# Patient Record
Sex: MALE | Race: WHITE | NOT HISPANIC OR LATINO | URBAN - METROPOLITAN AREA
[De-identification: names, ages, dates, MRNs, and addresses within clinical notes are randomized per-mention and may not be internally consistent; named-entity substitution may affect disease eponyms.]

---

## 2024-07-12 ENCOUNTER — OFFICE VISIT (OUTPATIENT)
Dept: INTERNAL MEDICINE | Facility: CLINIC | Age: 19
End: 2024-07-12
Payer: OTHER GOVERNMENT

## 2024-07-12 VITALS
HEART RATE: 79 BPM | TEMPERATURE: 99 F | BODY MASS INDEX: 34.08 KG/M2 | SYSTOLIC BLOOD PRESSURE: 142 MMHG | HEIGHT: 66 IN | WEIGHT: 212.06 LBS | DIASTOLIC BLOOD PRESSURE: 80 MMHG | OXYGEN SATURATION: 98 %

## 2024-07-12 DIAGNOSIS — J02.9 SORE THROAT: Primary | ICD-10-CM

## 2024-07-12 DIAGNOSIS — R53.83 FATIGUE, UNSPECIFIED TYPE: ICD-10-CM

## 2024-07-12 DIAGNOSIS — Z76.89 ENCOUNTER TO ESTABLISH CARE WITH NEW DOCTOR: ICD-10-CM

## 2024-07-12 PROCEDURE — 99999 PR PBB SHADOW E&M-NEW PATIENT-LVL III: CPT | Mod: PBBFAC,,, | Performed by: FAMILY MEDICINE

## 2024-07-12 PROCEDURE — 99203 OFFICE O/P NEW LOW 30 MIN: CPT | Mod: PBBFAC | Performed by: FAMILY MEDICINE

## 2024-07-12 PROCEDURE — 99204 OFFICE O/P NEW MOD 45 MIN: CPT | Mod: S$PBB,,, | Performed by: FAMILY MEDICINE

## 2024-07-12 NOTE — PROGRESS NOTES
"Subjective:     Patient ID: Duncan Irwin is a 18 y.o. male.   Chief Complaint: Sore Throat and Fatigue    HPI:  ** note that there were EMR latency issues during the completion of this documentation.  All relevant clinical data was entered at the time of completion, but due to system errors, it is possible some data was lost **    Presenting w/ c/o recurrent sore throat and tender lymph nodes over past several weeks. 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 Reports he had finger-stick mono test which was negative. He reports recurrent tonsil swelling and pain. Having some subjective fevers. Also c/o pervasive fatigue that is out of character for him.    Review of Systems   Constitutional:  Positive for fatigue and fever. Negative for chills.   HENT:  Positive for sore throat. Negative for nasal congestion, ear pain, nosebleeds, rhinorrhea, sinus pressure/congestion, trouble swallowing and voice change.    Respiratory:  Negative for chest tightness and shortness of breath.    Cardiovascular:  Negative for chest pain and palpitations.   Hematological:  Positive for adenopathy.   Psychiatric/Behavioral:  Negative for sleep disturbance. The patient is nervous/anxious.           Objective:      Vitals:    07/12/24 1500 07/12/24 1535   BP: (!) 158/94 (!) 142/80   BP Location: Left arm Left arm   Patient Position: Sitting Sitting   BP Method: Large (Manual) Large (Manual)   Pulse: 79    Temp: 98.9 °F (37.2 °C)    TempSrc: Oral    SpO2: 98%    Weight: 96.2 kg (212 lb 1.3 oz)    Height: 5' 6" (1.676 m)       Physical Exam  Vitals reviewed.   Constitutional:       General: He is not in acute distress.     Appearance: Normal appearance. He is not ill-appearing.   HENT:      Head: Normocephalic and atraumatic.      Right Ear: External ear normal.      Left Ear: External ear normal.      Nose: Nose normal.      Mouth/Throat:      Pharynx: No pharyngeal swelling, oropharyngeal " exudate, posterior oropharyngeal erythema or uvula swelling.      Tonsils: No tonsillar exudate or tonsillar abscesses. 0 on the right. 2+ on the left.      Comments: Cervical lymph node tenderness on R, more prominent on R; tonsils more prominent  Cardiovascular:      Rate and Rhythm: Normal rate and regular rhythm.      Pulses: Normal pulses.      Heart sounds: Normal heart sounds. No murmur heard.     No friction rub. No gallop.   Pulmonary:      Effort: No respiratory distress.      Breath sounds: Normal breath sounds. No stridor. No wheezing, rhonchi or rales.   Abdominal:      General: Abdomen is flat. Bowel sounds are normal.      Palpations: Abdomen is soft.   Musculoskeletal:      Cervical back: Normal range of motion.   Lymphadenopathy:      Cervical: Cervical adenopathy present.   Neurological:      General: No focal deficit present.      Mental Status: He is alert and oriented to person, place, and time. Mental status is at baseline.   Psychiatric:         Mood and Affect: Mood normal.         Behavior: Behavior normal.         Thought Content: Thought content normal.         Judgment: Judgment normal.           Assessment:       Problem List Items Addressed This Visit    None  Visit Diagnoses       Sore throat    -  Primary    Relevant Orders    Comprehensive Metabolic Panel    CBC Auto Differential    Hemoglobin A1C    TREY-BARR VIRUS ANTIBODY PANEL    Ambulatory referral/consult to ENT    Fatigue, unspecified type        Relevant Orders    TSH    T4, Free    Encounter to establish care with new doctor                  Plan:       1. Sore throat  POC Strep negative  Discussed possibile etiologies including EBV/mono, allergic rhinitis  Obtaining basic lab work to establish care while exploring etiologies  Low suspicion for infectious etiology requiring abx intervention at this time  D/t persistent sx will start ENT ref  - Comprehensive Metabolic Panel; Future  - CBC Auto Differential; Future  -  Hemoglobin A1C; Future  - TREY-BARR VIRUS ANTIBODY PANEL; Future  - Ambulatory referral/consult to ENT; Future    2. Fatigue, unspecified type  As above  Checking labs for possible etiologies  - TSH; Future  - T4, Free; Future    3. Encounter to establish care with new doctor  Reviewed chronic medical conditions, updated problem list and medication list          Follow up in about 6 weeks (around 8/23/2024) for Annual Visit.     Qasim Henry MD, FAAFP  Family Medicine Physician  [facility]  07/14/2024      This note was produced using voice recognition technology. Some typographical or syntax errors may be present, despite best efforts with proofreading.

## 2024-07-12 NOTE — LETTER
July 12, 2024      [facility]  [address]  [facility] [address]  Phone: [phone]  Fax: [phone]       Patient: Duncan Irwin   YOB: 2005  Date of Visit: 07/12/2024    To Whom It May Concern:    Bart Irwin  was at [facility] on 07/12/2024. The patient may return to work/school on 07/15/2024 with no restrictions. If you have any questions or concerns, or if I can be of further assistance, please do not hesitate to contact me.    Sincerely,        Qasim Henry MD, FAAFP  Family Medicine Physician  [facility]

## 2024-07-12 NOTE — LETTER
July 12, 2024      [facility]  [address]  [facility] [address]  Phone: [phone]  Fax: [phone]       Patient: Duncan Irwin   YOB: 2005  Date of Visit: 07/12/2024    To Whom It May Concern:    Bart Irwin  was at [facility] on 07/12/2024. The patient may return to work on 07/13/2024 with no restrictions. If you have any questions or concerns, or if I can be of further assistance, please do not hesitate to contact me.    Sincerely,    Diego Mark MA

## 2024-07-18 ENCOUNTER — HOSPITAL ENCOUNTER (EMERGENCY)
Facility: HOSPITAL | Age: 19
Discharge: HOME | End: 2024-07-18
Attending: EMERGENCY MEDICINE
Payer: OTHER GOVERNMENT

## 2024-07-18 ENCOUNTER — TELEPHONE (OUTPATIENT)
Dept: INTERNAL MEDICINE | Facility: CLINIC | Age: 19
End: 2024-07-18
Payer: OTHER GOVERNMENT

## 2024-07-18 VITALS
HEIGHT: 66 IN | HEART RATE: 88 BPM | SYSTOLIC BLOOD PRESSURE: 152 MMHG | RESPIRATION RATE: 16 BRPM | WEIGHT: 212 LBS | TEMPERATURE: 99 F | OXYGEN SATURATION: 98 % | DIASTOLIC BLOOD PRESSURE: 84 MMHG | BODY MASS INDEX: 34.07 KG/M2

## 2024-07-18 DIAGNOSIS — R30.0 DYSURIA: Primary | ICD-10-CM

## 2024-07-18 LAB
BILIRUB UR QL STRIP: NEGATIVE
CLARITY UR REFRACT.AUTO: CLEAR
COLOR UR AUTO: YELLOW
GLUCOSE UR QL STRIP: NEGATIVE
HCV AB SERPL QL IA: NORMAL
HGB UR QL STRIP: NEGATIVE
HIV 1+2 AB+HIV1 P24 AG SERPL QL IA: NORMAL
KETONES UR QL STRIP: NEGATIVE
LEUKOCYTE ESTERASE UR QL STRIP: NEGATIVE
NITRITE UR QL STRIP: NEGATIVE
PH UR STRIP: 7 [PH] (ref 5–8)
PROT UR QL STRIP: NEGATIVE
SP GR UR STRIP: 1.02 (ref 1–1.03)
TREPONEMA PALLIDUM IGG+IGM AB [PRESENCE] IN SERUM OR PLASMA BY IMMUNOASSAY: NONREACTIVE
URN SPEC COLLECT METH UR: NORMAL

## 2024-07-18 PROCEDURE — 81003 URINALYSIS AUTO W/O SCOPE: CPT | Performed by: EMERGENCY MEDICINE

## 2024-07-18 PROCEDURE — 25000003 PHARM REV CODE 250

## 2024-07-18 PROCEDURE — 87591 N.GONORRHOEAE DNA AMP PROB: CPT | Performed by: EMERGENCY MEDICINE

## 2024-07-18 PROCEDURE — 86593 SYPHILIS TEST NON-TREP QUANT: CPT | Performed by: EMERGENCY MEDICINE

## 2024-07-18 PROCEDURE — 87491 CHLMYD TRACH DNA AMP PROBE: CPT | Performed by: EMERGENCY MEDICINE

## 2024-07-18 PROCEDURE — 63600175 PHARM REV CODE 636 W HCPCS

## 2024-07-18 PROCEDURE — 86803 HEPATITIS C AB TEST: CPT | Performed by: PHYSICIAN ASSISTANT

## 2024-07-18 PROCEDURE — 99284 EMERGENCY DEPT VISIT MOD MDM: CPT | Mod: 25

## 2024-07-18 PROCEDURE — 87389 HIV-1 AG W/HIV-1&-2 AB AG IA: CPT | Performed by: PHYSICIAN ASSISTANT

## 2024-07-18 PROCEDURE — 96374 THER/PROPH/DIAG INJ IV PUSH: CPT

## 2024-07-18 RX ORDER — CEFTRIAXONE 500 MG/1
500 INJECTION, POWDER, FOR SOLUTION INTRAMUSCULAR; INTRAVENOUS
Status: DISCONTINUED | OUTPATIENT
Start: 2024-07-18 | End: 2024-07-18

## 2024-07-18 RX ORDER — CEFTRIAXONE 500 MG/1
500 INJECTION, POWDER, FOR SOLUTION INTRAMUSCULAR; INTRAVENOUS
Status: COMPLETED | OUTPATIENT
Start: 2024-07-18 | End: 2024-07-18

## 2024-07-18 RX ORDER — DOXYCYCLINE 100 MG/1
100 CAPSULE ORAL 2 TIMES DAILY
Qty: 19 CAPSULE | Refills: 0 | Status: SHIPPED | OUTPATIENT
Start: 2024-07-18 | End: 2024-07-28

## 2024-07-18 RX ORDER — DOXYCYCLINE HYCLATE 100 MG
100 TABLET ORAL
Status: COMPLETED | OUTPATIENT
Start: 2024-07-18 | End: 2024-07-18

## 2024-07-18 RX ADMIN — CEFTRIAXONE SODIUM 500 MG: 500 INJECTION, POWDER, FOR SOLUTION INTRAMUSCULAR; INTRAVENOUS at 12:07

## 2024-07-18 RX ADMIN — DOXYCYCLINE HYCLATE 100 MG: 100 TABLET, COATED ORAL at 12:07

## 2024-07-18 NOTE — TELEPHONE ENCOUNTER
GWENDOLYN Chavez, Jeffrey Henry Qasim Staff  Please be advised that the patient is a  [employer] member. [facility] requires that any referral must come from the patient's PCM/PCP. Nain has Dr. Robin Green  listed as the PCP/PCM, the referral has to come from this PCP/PCM for this referral to be approved. Please contact the patient to obtain a new referral from her PCM. Thank you    On:7/17/2024 11:47:10 AM By:[facility] Note Type:< Fax Note >  THIS IS AN ACTIVE DUTY WHO NEEDS A REFERRAL FROM THE  PCM FOR ANY TREATMENT/EVALUATION FOR ENT AND MUST INCLUDE AN ORDER  ENTRY #/DARI # FROM THE  PHYSICIAN. PLEASE HAVE THE ACTIVE DUTY  FOLLOW UP WITH THE  PCM FOR ENT.

## 2024-07-18 NOTE — ED PROVIDER NOTES
Encounter Date: 7/18/2024       History     Chief Complaint   Patient presents with    Dysuria     Dysuria x 3 days.      18-year-old male with no past medical history now presents with a chief complaint of dysuria.  Patient reports that for the past 3 days he has experienced worsening pain with urination in addition to penile discomfort at rest.  Patient had a new sexual partner 2 weeks ago and is concerned he may have a sexually transmitted infection. Patient denies any nausea, vomiting, fevers, ulcers, penile discharge, or radiating pain including testicular pain, groin pain, flank pain or back pain.    The history is provided by the patient.     Review of patient's allergies indicates:  No Known Allergies  No past medical history on file.  No past surgical history on file.  No family history on file.  Social History     Tobacco Use    Smoking status: Never    Smokeless tobacco: Never   Substance Use Topics    Alcohol use: Never    Drug use: Never     Review of Systems  See HPI     Physical Exam     Initial Vitals [07/18/24 1004]   BP Pulse Resp Temp SpO2   (!) 152/84 88 16 99.3 °F (37.4 °C) 98 %      MAP       --         Physical Exam  Gen: AxOx3, well nourished, appears stated age, no pallor, no jaundice, appears well hydrated  Eye: EOMI, no scleral icterus, no periorbital edema or ecchymosis  Head: Normocephalic, atraumatic, no lesions, scalp appears normal  ENT: Neck supple, no stridor, no masses, no drooling or voice changes  CVS: All distal pulses intact with normal rate and rhythm, no JVD, normal S1/S2, no murmur  Pulm: Normal breath sounds, no wheezes, rales or rhonchi, no increased work of breathing  Abd:  Nondistended, soft, nontender, no organomegaly, no CVAT  : Performed with chaperone present  - Normal-appearing penis and scrotum  - No discharge noted from meatus  - No inguinal swellings  - No tenderness to palpation over penis  - No tenderness to palpation of testis  Ext: No edema, no lesions,  rashes, or deformity  Neuro: GCS15, moving all extremities, gait intact, face grossly symmetric  Psych: normal affect, cooperative, well groomed, makes good eye contact    ED Course   Procedures  Labs Reviewed   HIV 1 / 2 ANTIBODY    Narrative:     Release to patient->Immediate   HEPATITIS C ANTIBODY    Narrative:     Release to patient->Immediate   URINALYSIS, REFLEX TO URINE CULTURE    Narrative:     Specimen Source->Urine   TREPONEMA PALLIDIUM ANTIBODIES IGG, IGM   C. TRACHOMATIS/N. GONORRHOEAE BY AMP DNA          Imaging Results    None          Medications   doxycycline tablet 100 mg (100 mg Oral Given 7/18/24 1203)   cefTRIAXone injection 500 mg (500 mg Intravenous Given 7/18/24 1203)     Medical Decision Making  Initial assessment  18-year-old male with no past medical history now presents with a chief complaint of dysuria. . Patient is able to vocalise, breathing spontaneously, hemodynamically stable, oriented, moving all 4 limbs spontaneously.  Unremarkable physical examination.      Differential diagnosis  STI   UTI  Considered but lower suspicion for nephrolithiasis  Considered but doubt epididymitis, testicular torsion, hernia      ED management  Patient was well-appearing and high suspicion for UTI versus STI.  Workup consistent with UA without any abnormalities.  I have low suspicion for red flags including epididymitis, testicular torsion, or hernia and suspect patient's symptoms are likely due to STI.  Had conversation with the patient regarding prophylactic treatment for sexually transmitted infections and gave Rocephin and doxy within the department and prescribed 10 day course of doxycycline.  Patient encouraged of follow up with the primary care doctor and return if symptoms worsen.    Amount and/or Complexity of Data Reviewed  Labs: ordered. Decision-making details documented in ED Course.    Risk  Prescription drug management.              Attending Attestation:   Physician Attestation  Statement for Resident:  As the supervising MD   Physician Attestation Statement: I have personally seen and examined this patient.   I agree with the above history.  -:   As the supervising MD I agree with the above PE.     As the supervising MD I agree with the above treatment, course, plan, and disposition.    I have reviewed and agree with the residents interpretation of the following: lab data.                 ED Course as of 07/18/24 1212   Thu Jul 18, 2024   1022 BP(!): 152/84 [PM]   1022 Temp: 99.3 °F (37.4 °C) [PM]   1022 Pulse: 88 [PM]   1022 SpO2: 98 % [PM]   1136 Treponema Pallidum Antibodies (IgG, IgM): Nonreactive [PM]   1144 Glucose, UA: Negative [PM]   1144 Leukocyte Esterase, UA: Negative [PM]   1144 NITRITE UA: Negative [PM]   1144 Blood, UA: Negative [PM]      ED Course User Index  [PM] Rico Reyes MD                             Clinical Impression:  Final diagnoses:  [R30.0] Dysuria (Primary)          ED Disposition Condition    Discharge Stable          ED Prescriptions       Medication Sig Dispense Start Date End Date Auth. Provider    doxycycline (VIBRAMYCIN) 100 MG Cap Take 1 capsule (100 mg total) by mouth 2 (two) times daily. for 19 doses 19 capsule 7/18/2024 7/28/2024 Rico Reyes MD          Follow-up Information       Follow up With Specialties Details Why Contact Info    Qasim Henry MD Internal Medicine Schedule an appointment as soon as possible for a visit   [address]  [place] [address]  [phone]      [facility] - Emergency Dept Emergency Medicine  As needed, If symptoms worsen [address] [facility] [id number] [phone]             Rico Reyes MD  Resident  07/18/24 1212       Matt Hamilton MD  07/18/24 3100

## 2024-07-22 LAB
C TRACH DNA SPEC QL NAA+PROBE: NOT DETECTED
N GONORRHOEA DNA SPEC QL NAA+PROBE: NOT DETECTED